# Patient Record
Sex: FEMALE | Race: WHITE | URBAN - METROPOLITAN AREA
[De-identification: names, ages, dates, MRNs, and addresses within clinical notes are randomized per-mention and may not be internally consistent; named-entity substitution may affect disease eponyms.]

---

## 2022-04-27 ENCOUNTER — APPOINTMENT (RX ONLY)
Dept: URBAN - METROPOLITAN AREA CLINIC 236 | Facility: CLINIC | Age: 37
Setting detail: DERMATOLOGY
End: 2022-04-27

## 2022-04-27 DIAGNOSIS — T07XXXA INSECT BITE, NONVENOMOUS, OF OTHER, MULTIPLE, AND UNSPECIFIED SITES, WITHOUT MENTION OF INFECTION: ICD-10-CM

## 2022-04-27 DIAGNOSIS — B07.8 OTHER VIRAL WARTS: ICD-10-CM

## 2022-04-27 DIAGNOSIS — D22 MELANOCYTIC NEVI: ICD-10-CM

## 2022-04-27 PROBLEM — D22.5 MELANOCYTIC NEVI OF TRUNK: Status: ACTIVE | Noted: 2022-04-27

## 2022-04-27 PROBLEM — D22.4 MELANOCYTIC NEVI OF SCALP AND NECK: Status: ACTIVE | Noted: 2022-04-27

## 2022-04-27 PROBLEM — S60.862A INSECT BITE (NONVENOMOUS) OF LEFT WRIST, INITIAL ENCOUNTER: Status: ACTIVE | Noted: 2022-04-27

## 2022-04-27 PROCEDURE — 99213 OFFICE O/P EST LOW 20 MIN: CPT | Mod: 25

## 2022-04-27 PROCEDURE — 17110 DESTRUCTION B9 LES UP TO 14: CPT

## 2022-04-27 PROCEDURE — ? ADDITIONAL NOTES

## 2022-04-27 PROCEDURE — ? PRESCRIPTION MEDICATION MANAGEMENT

## 2022-04-27 PROCEDURE — ? PRESCRIPTION

## 2022-04-27 PROCEDURE — ? COUNSELING

## 2022-04-27 PROCEDURE — ? LIQUID NITROGEN

## 2022-04-27 RX ORDER — TRIAMCINOLONE ACETONIDE 1 MG/G
CREAM TOPICAL BID
Qty: 15 | Refills: 0 | Status: ERX | COMMUNITY
Start: 2022-04-27

## 2022-04-27 RX ADMIN — TRIAMCINOLONE ACETONIDE: 1 CREAM TOPICAL at 00:00

## 2022-04-27 ASSESSMENT — LOCATION DETAILED DESCRIPTION DERM
LOCATION DETAILED: LEFT PLANTAR FOREFOOT OVERLYING 4TH METATARSAL
LOCATION DETAILED: LEFT VENTRAL WRIST
LOCATION DETAILED: RIGHT INFERIOR PARIETAL SCALP
LOCATION DETAILED: RIGHT SUPERIOR UPPER BACK

## 2022-04-27 ASSESSMENT — LOCATION ZONE DERM
LOCATION ZONE: TRUNK
LOCATION ZONE: FEET
LOCATION ZONE: ARM
LOCATION ZONE: SCALP

## 2022-04-27 ASSESSMENT — LOCATION SIMPLE DESCRIPTION DERM
LOCATION SIMPLE: LEFT PLANTAR SURFACE
LOCATION SIMPLE: SCALP
LOCATION SIMPLE: LEFT WRIST
LOCATION SIMPLE: RIGHT UPPER BACK

## 2022-04-27 NOTE — PROCEDURE: LIQUID NITROGEN
Show Aperture Variable?: Yes
Medical Necessity Information: It is in your best interest to select a reason for this procedure from the list below. All of these items fulfill various CMS LCD requirements except the new and changing color options.
Render Note In Bullet Format When Appropriate: No
Consent: The patient's consent was obtained including but not limited to risks of crusting, scabbing, blistering, scarring, darker or lighter pigmentary change, recurrence, incomplete removal and infection.
Spray Paint Text: The liquid nitrogen was applied to the skin utilizing a spray paint frosting technique.
Medical Necessity Clause: This procedure was medically necessary because the lesions that were treated were:
Detail Level: Detailed
Post-Care Instructions: I reviewed with the patient in detail post-care instructions. Patient is to wear sunprotection, and avoid picking at any of the treated lesions. Pt may apply Vaseline to crusted or scabbing areas.

## 2022-06-09 ENCOUNTER — APPOINTMENT (RX ONLY)
Dept: URBAN - METROPOLITAN AREA CLINIC 236 | Facility: CLINIC | Age: 37
Setting detail: DERMATOLOGY
End: 2022-06-09

## 2022-06-09 DIAGNOSIS — L29.89 OTHER PRURITUS: ICD-10-CM | Status: INADEQUATELY CONTROLLED

## 2022-06-09 DIAGNOSIS — L01.01 NON-BULLOUS IMPETIGO: ICD-10-CM

## 2022-06-09 DIAGNOSIS — L29.8 OTHER PRURITUS: ICD-10-CM | Status: INADEQUATELY CONTROLLED

## 2022-06-09 DIAGNOSIS — L259 CONTACT DERMATITIS AND OTHER ECZEMA, UNSPECIFIED CAUSE: ICD-10-CM | Status: INADEQUATELY CONTROLLED

## 2022-06-09 PROBLEM — L23.9 ALLERGIC CONTACT DERMATITIS, UNSPECIFIED CAUSE: Status: ACTIVE | Noted: 2022-06-09

## 2022-06-09 PROCEDURE — ? INTRAMUSCULAR KENALOG

## 2022-06-09 PROCEDURE — ? MEDICATION COUNSELING

## 2022-06-09 PROCEDURE — ? COUNSELING

## 2022-06-09 PROCEDURE — ? PRESCRIPTION

## 2022-06-09 PROCEDURE — 99213 OFFICE O/P EST LOW 20 MIN: CPT | Mod: 25

## 2022-06-09 PROCEDURE — 96372 THER/PROPH/DIAG INJ SC/IM: CPT

## 2022-06-09 PROCEDURE — ? PRESCRIPTION MEDICATION MANAGEMENT

## 2022-06-09 RX ORDER — MOMETASONE FUROATE 1 MG/G
CREAM TOPICAL BID
Qty: 45 | Refills: 3 | Status: ERX | COMMUNITY
Start: 2022-06-09

## 2022-06-09 RX ORDER — DOXYCYCLINE HYCLATE 100 MG/1
TABLET, COATED ORAL BID
Qty: 10 | Refills: 0 | Status: ERX | COMMUNITY
Start: 2022-06-09

## 2022-06-09 RX ADMIN — DOXYCYCLINE HYCLATE: 100 TABLET, COATED ORAL at 00:00

## 2022-06-09 RX ADMIN — MOMETASONE FUROATE: 1 CREAM TOPICAL at 00:00

## 2022-06-09 ASSESSMENT — ITCH NUMERIC RATING SCALE: HOW SEVERE IS YOUR ITCHING?: 10

## 2022-06-09 ASSESSMENT — LOCATION SIMPLE DESCRIPTION DERM
LOCATION SIMPLE: RIGHT FOREARM
LOCATION SIMPLE: RIGHT LOWER BACK
LOCATION SIMPLE: RIGHT UPPER BACK
LOCATION SIMPLE: LEFT FOREARM

## 2022-06-09 ASSESSMENT — LOCATION ZONE DERM
LOCATION ZONE: ARM
LOCATION ZONE: TRUNK

## 2022-06-09 ASSESSMENT — SEVERITY ASSESSMENT: SEVERITY: SEVERE

## 2022-06-09 ASSESSMENT — LOCATION DETAILED DESCRIPTION DERM
LOCATION DETAILED: RIGHT INFERIOR UPPER BACK
LOCATION DETAILED: RIGHT VENTRAL DISTAL FOREARM
LOCATION DETAILED: RIGHT INFERIOR LATERAL LOWER BACK
LOCATION DETAILED: LEFT VENTRAL PROXIMAL FOREARM
LOCATION DETAILED: RIGHT VENTRAL PROXIMAL FOREARM

## 2022-06-09 NOTE — PROCEDURE: MEDICATION COUNSELING
Xeldorianz Pregnancy And Lactation Text: This medication is Pregnancy Category D and is not considered safe during pregnancy.  The risk during breast feeding is also uncertain.

## 2022-06-09 NOTE — PROCEDURE: MEDICATION COUNSELING
92 Elidel Counseling: Patient may experience a mild burning sensation during topical application. Elidel is not approved in children less than 2 years of age. There have been case reports of hematologic and skin malignancies in patients using topical calcineurin inhibitors although causality is questionable.

## 2022-06-09 NOTE — PROCEDURE: INTRAMUSCULAR KENALOG
Detail Level: None
Total Volume (Ccs): 1
Add Option For Additional Mediation: No
Concentration (Mg/Ml) Of Additional Medication: 2.5
Kenalog Preparation: kenalog
Concentration (Mg/Ml): 40.0
Consent: The risks of atrophy were reviewed with the patient.

## 2022-07-11 ENCOUNTER — APPOINTMENT (RX ONLY)
Dept: URBAN - METROPOLITAN AREA CLINIC 236 | Facility: CLINIC | Age: 37
Setting detail: DERMATOLOGY
End: 2022-07-11

## 2022-07-11 DIAGNOSIS — L259 CONTACT DERMATITIS AND OTHER ECZEMA, UNSPECIFIED CAUSE: ICD-10-CM | Status: RESOLVING

## 2022-07-11 DIAGNOSIS — B07.8 OTHER VIRAL WARTS: ICD-10-CM

## 2022-07-11 PROBLEM — L23.9 ALLERGIC CONTACT DERMATITIS, UNSPECIFIED CAUSE: Status: ACTIVE | Noted: 2022-07-11

## 2022-07-11 PROCEDURE — ? PRESCRIPTION MEDICATION MANAGEMENT

## 2022-07-11 PROCEDURE — ? COUNSELING

## 2022-07-11 PROCEDURE — 99213 OFFICE O/P EST LOW 20 MIN: CPT | Mod: 25

## 2022-07-11 PROCEDURE — 17110 DESTRUCTION B9 LES UP TO 14: CPT

## 2022-07-11 PROCEDURE — ? LIQUID NITROGEN

## 2022-07-11 ASSESSMENT — LOCATION DETAILED DESCRIPTION DERM
LOCATION DETAILED: LEFT PLANTAR FOREFOOT OVERLYING 4TH METATARSAL
LOCATION DETAILED: LEFT VENTRAL PROXIMAL FOREARM
LOCATION DETAILED: RIGHT VENTRAL DISTAL FOREARM

## 2022-07-11 ASSESSMENT — LOCATION SIMPLE DESCRIPTION DERM
LOCATION SIMPLE: LEFT PLANTAR SURFACE
LOCATION SIMPLE: LEFT FOREARM
LOCATION SIMPLE: RIGHT FOREARM

## 2022-07-11 ASSESSMENT — LOCATION ZONE DERM
LOCATION ZONE: FEET
LOCATION ZONE: ARM

## 2022-07-11 NOTE — PROCEDURE: PRESCRIPTION MEDICATION MANAGEMENT
Continue Regimen: Sal acid 60% apply qhs
Detail Level: Zone
Render In Strict Bullet Format?: No
Plan: Use topical steroids as needed for flares

## 2022-10-21 ENCOUNTER — APPOINTMENT (RX ONLY)
Dept: URBAN - METROPOLITAN AREA CLINIC 203 | Facility: CLINIC | Age: 37
Setting detail: DERMATOLOGY
End: 2022-10-21

## 2022-10-21 DIAGNOSIS — B07.8 OTHER VIRAL WARTS: ICD-10-CM | Status: INADEQUATELY CONTROLLED

## 2022-10-21 PROCEDURE — ? LIQUID NITROGEN

## 2022-10-21 PROCEDURE — ? ADDITIONAL NOTES

## 2022-10-21 PROCEDURE — ? COUNSELING

## 2022-10-21 PROCEDURE — 17110 DESTRUCTION B9 LES UP TO 14: CPT

## 2022-10-21 ASSESSMENT — LOCATION SIMPLE DESCRIPTION DERM: LOCATION SIMPLE: LEFT PLANTAR SURFACE

## 2022-10-21 ASSESSMENT — LOCATION ZONE DERM: LOCATION ZONE: FEET

## 2022-10-21 ASSESSMENT — LOCATION DETAILED DESCRIPTION DERM: LOCATION DETAILED: LEFT PLANTAR FOREFOOT OVERLYING 4TH METATARSAL

## 2022-10-21 NOTE — PROCEDURE: LIQUID NITROGEN
Detail Level: Detailed
Consent: The patient's consent was obtained including but not limited to risks of crusting, scabbing, blistering, scarring, darker or lighter pigmentary change, recurrence, incomplete removal and infection.
Include Z78.9 (Other Specified Conditions Influencing Health Status) As An Associated Diagnosis?: No
Show Applicator Variable?: Yes
Medical Necessity Information: It is in your best interest to select a reason for this procedure from the list below. All of these items fulfill various CMS LCD requirements except the new and changing color options.
Spray Paint Text: The liquid nitrogen was applied to the skin utilizing a spray paint frosting technique.
Post-Care Instructions: I reviewed with the patient in detail post-care instructions. Patient is to wear sunprotection, and avoid picking at any of the treated lesions. Pt may apply Vaseline to crusted or scabbing areas.
Medical Necessity Clause: This procedure was medically necessary because the lesions that were treated were:

## 2022-12-07 ENCOUNTER — APPOINTMENT (RX ONLY)
Dept: URBAN - METROPOLITAN AREA CLINIC 236 | Facility: CLINIC | Age: 37
Setting detail: DERMATOLOGY
End: 2022-12-07

## 2022-12-07 DIAGNOSIS — B07.8 OTHER VIRAL WARTS: ICD-10-CM | Status: INADEQUATELY CONTROLLED

## 2022-12-07 PROCEDURE — ? LIQUID NITROGEN

## 2022-12-07 PROCEDURE — 17110 DESTRUCTION B9 LES UP TO 14: CPT

## 2022-12-07 PROCEDURE — ? COUNSELING

## 2022-12-07 PROCEDURE — ? ADDITIONAL NOTES

## 2022-12-07 PROCEDURE — ? PRESCRIPTION MEDICATION MANAGEMENT

## 2022-12-07 ASSESSMENT — LOCATION ZONE DERM: LOCATION ZONE: FEET

## 2022-12-07 ASSESSMENT — LOCATION SIMPLE DESCRIPTION DERM: LOCATION SIMPLE: LEFT PLANTAR SURFACE

## 2022-12-07 ASSESSMENT — LOCATION DETAILED DESCRIPTION DERM: LOCATION DETAILED: LEFT PLANTAR FOREFOOT OVERLYING 4TH METATARSAL

## 2023-01-11 ENCOUNTER — APPOINTMENT (RX ONLY)
Dept: URBAN - METROPOLITAN AREA CLINIC 236 | Facility: CLINIC | Age: 38
Setting detail: DERMATOLOGY
End: 2023-01-11

## 2023-01-11 DIAGNOSIS — B07.8 OTHER VIRAL WARTS: ICD-10-CM

## 2023-01-11 PROCEDURE — 17110 DESTRUCTION B9 LES UP TO 14: CPT

## 2023-01-11 PROCEDURE — ? COUNSELING

## 2023-01-11 PROCEDURE — ? LIQUID NITROGEN

## 2023-01-11 PROCEDURE — ? ADDITIONAL NOTES

## 2023-01-11 ASSESSMENT — LOCATION DETAILED DESCRIPTION DERM: LOCATION DETAILED: LEFT PLANTAR FOREFOOT OVERLYING 4TH METATARSAL

## 2023-01-11 ASSESSMENT — LOCATION ZONE DERM: LOCATION ZONE: FEET

## 2023-01-11 ASSESSMENT — LOCATION SIMPLE DESCRIPTION DERM: LOCATION SIMPLE: LEFT PLANTAR SURFACE

## 2023-02-14 ENCOUNTER — APPOINTMENT (RX ONLY)
Dept: URBAN - METROPOLITAN AREA CLINIC 236 | Facility: CLINIC | Age: 38
Setting detail: DERMATOLOGY
End: 2023-02-14

## 2023-02-14 DIAGNOSIS — B07.8 OTHER VIRAL WARTS: ICD-10-CM | Status: INADEQUATELY CONTROLLED

## 2023-02-14 PROCEDURE — ? COUNSELING

## 2023-02-14 PROCEDURE — ? LIQUID NITROGEN

## 2023-02-14 PROCEDURE — 17110 DESTRUCTION B9 LES UP TO 14: CPT

## 2023-02-14 ASSESSMENT — LOCATION DETAILED DESCRIPTION DERM: LOCATION DETAILED: LEFT PLANTAR FOREFOOT OVERLYING 4TH METATARSAL

## 2023-02-14 ASSESSMENT — LOCATION SIMPLE DESCRIPTION DERM: LOCATION SIMPLE: LEFT PLANTAR SURFACE

## 2023-02-14 ASSESSMENT — LOCATION ZONE DERM: LOCATION ZONE: FEET
